# Patient Record
Sex: FEMALE | Race: BLACK OR AFRICAN AMERICAN | NOT HISPANIC OR LATINO | URBAN - METROPOLITAN AREA
[De-identification: names, ages, dates, MRNs, and addresses within clinical notes are randomized per-mention and may not be internally consistent; named-entity substitution may affect disease eponyms.]

---

## 2022-08-14 ENCOUNTER — INPATIENT (INPATIENT)
Facility: HOSPITAL | Age: 47
LOS: 1 days | Discharge: HOME | DRG: 896 | End: 2022-08-16
Attending: STUDENT IN AN ORGANIZED HEALTH CARE EDUCATION/TRAINING PROGRAM | Admitting: STUDENT IN AN ORGANIZED HEALTH CARE EDUCATION/TRAINING PROGRAM
Payer: MEDICAID

## 2022-08-14 VITALS
OXYGEN SATURATION: 99 % | HEART RATE: 160 BPM | RESPIRATION RATE: 19 BRPM | TEMPERATURE: 100 F | SYSTOLIC BLOOD PRESSURE: 203 MMHG | DIASTOLIC BLOOD PRESSURE: 107 MMHG

## 2022-08-14 LAB
ALBUMIN SERPL ELPH-MCNC: 3.6 G/DL — SIGNIFICANT CHANGE UP (ref 3.5–5)
ALP SERPL-CCNC: 80 U/L — SIGNIFICANT CHANGE UP (ref 40–120)
ALT FLD-CCNC: 55 U/L DA — SIGNIFICANT CHANGE UP (ref 10–60)
AMMONIA BLD-MCNC: 97 UMOL/L — HIGH (ref 11–32)
ANION GAP SERPL CALC-SCNC: 20 MMOL/L — HIGH (ref 5–17)
APAP SERPL-MCNC: <2 UG/ML — LOW (ref 10–30)
APPEARANCE UR: CLEAR — SIGNIFICANT CHANGE UP
AST SERPL-CCNC: 50 U/L — HIGH (ref 10–40)
BACTERIA # UR AUTO: ABNORMAL /HPF
BASE EXCESS BLDV CALC-SCNC: -7.1 MMOL/L — SIGNIFICANT CHANGE UP
BASOPHILS # BLD AUTO: 0.02 K/UL — SIGNIFICANT CHANGE UP (ref 0–0.2)
BASOPHILS NFR BLD AUTO: 0.3 % — SIGNIFICANT CHANGE UP (ref 0–2)
BILIRUB SERPL-MCNC: 0.5 MG/DL — SIGNIFICANT CHANGE UP (ref 0.2–1.2)
BILIRUB UR-MCNC: NEGATIVE — SIGNIFICANT CHANGE UP
BLOOD GAS COMMENTS, VENOUS: SIGNIFICANT CHANGE UP
BUN SERPL-MCNC: 34 MG/DL — HIGH (ref 7–18)
CALCIUM SERPL-MCNC: 8.8 MG/DL — SIGNIFICANT CHANGE UP (ref 8.4–10.5)
CHLORIDE SERPL-SCNC: 102 MMOL/L — SIGNIFICANT CHANGE UP (ref 96–108)
CK SERPL-CCNC: 276 U/L — HIGH (ref 21–215)
CO2 SERPL-SCNC: 17 MMOL/L — LOW (ref 22–31)
COLOR SPEC: YELLOW — SIGNIFICANT CHANGE UP
CREAT SERPL-MCNC: 1.8 MG/DL — HIGH (ref 0.5–1.3)
DIFF PNL FLD: NEGATIVE — SIGNIFICANT CHANGE UP
EGFR: 35 ML/MIN/1.73M2 — LOW
EOSINOPHIL # BLD AUTO: 0.05 K/UL — SIGNIFICANT CHANGE UP (ref 0–0.5)
EOSINOPHIL NFR BLD AUTO: 0.8 % — SIGNIFICANT CHANGE UP (ref 0–6)
EPI CELLS # UR: SIGNIFICANT CHANGE UP /HPF
ETHANOL SERPL-MCNC: 7 MG/DL — SIGNIFICANT CHANGE UP (ref 0–10)
GLUCOSE SERPL-MCNC: 143 MG/DL — HIGH (ref 70–99)
GLUCOSE UR QL: NEGATIVE — SIGNIFICANT CHANGE UP
GRAN CASTS # UR COMP ASSIST: ABNORMAL /LPF
HCG UR QL: NEGATIVE — SIGNIFICANT CHANGE UP
HCO3 BLDV-SCNC: 18 MMOL/L — LOW (ref 22–29)
HCT VFR BLD CALC: 33.1 % — LOW (ref 34.5–45)
HGB BLD-MCNC: 10.9 G/DL — LOW (ref 11.5–15.5)
HIV 1 & 2 AB SERPL IA.RAPID: SIGNIFICANT CHANGE UP
HOROWITZ INDEX BLDV+IHG-RTO: 21 — SIGNIFICANT CHANGE UP
HYALINE CASTS # UR AUTO: ABNORMAL /LPF
IMM GRANULOCYTES NFR BLD AUTO: 0.2 % — SIGNIFICANT CHANGE UP (ref 0–1.5)
KETONES UR-MCNC: NEGATIVE — SIGNIFICANT CHANGE UP
LACTATE SERPL-SCNC: 10.8 MMOL/L — CRITICAL HIGH (ref 0.7–2)
LEUKOCYTE ESTERASE UR-ACNC: NEGATIVE — SIGNIFICANT CHANGE UP
LYMPHOCYTES # BLD AUTO: 1.22 K/UL — SIGNIFICANT CHANGE UP (ref 1–3.3)
LYMPHOCYTES # BLD AUTO: 19.8 % — SIGNIFICANT CHANGE UP (ref 13–44)
MCHC RBC-ENTMCNC: 31 PG — SIGNIFICANT CHANGE UP (ref 27–34)
MCHC RBC-ENTMCNC: 32.9 GM/DL — SIGNIFICANT CHANGE UP (ref 32–36)
MCV RBC AUTO: 94 FL — SIGNIFICANT CHANGE UP (ref 80–100)
MONOCYTES # BLD AUTO: 0.63 K/UL — SIGNIFICANT CHANGE UP (ref 0–0.9)
MONOCYTES NFR BLD AUTO: 10.2 % — SIGNIFICANT CHANGE UP (ref 2–14)
NEUTROPHILS # BLD AUTO: 4.22 K/UL — SIGNIFICANT CHANGE UP (ref 1.8–7.4)
NEUTROPHILS NFR BLD AUTO: 68.7 % — SIGNIFICANT CHANGE UP (ref 43–77)
NITRITE UR-MCNC: NEGATIVE — SIGNIFICANT CHANGE UP
NRBC # BLD: 0 /100 WBCS — SIGNIFICANT CHANGE UP (ref 0–0)
PCO2 BLDV: 34 MMHG — LOW (ref 39–42)
PCP SPEC-MCNC: SIGNIFICANT CHANGE UP
PH BLDV: 7.33 — SIGNIFICANT CHANGE UP (ref 7.32–7.43)
PH UR: 6 — SIGNIFICANT CHANGE UP (ref 5–8)
PLATELET # BLD AUTO: 108 K/UL — LOW (ref 150–400)
PO2 BLDV: 92 MMHG — SIGNIFICANT CHANGE UP
POTASSIUM SERPL-MCNC: 3.6 MMOL/L — SIGNIFICANT CHANGE UP (ref 3.5–5.3)
POTASSIUM SERPL-SCNC: 3.6 MMOL/L — SIGNIFICANT CHANGE UP (ref 3.5–5.3)
PROT SERPL-MCNC: 7.8 G/DL — SIGNIFICANT CHANGE UP (ref 6–8.3)
PROT UR-MCNC: 100
RBC # BLD: 3.52 M/UL — LOW (ref 3.8–5.2)
RBC # FLD: 17.7 % — HIGH (ref 10.3–14.5)
RBC CASTS # UR COMP ASSIST: SIGNIFICANT CHANGE UP /HPF (ref 0–2)
SALICYLATES SERPL-MCNC: <1.7 MG/DL — LOW (ref 2.8–20)
SAO2 % BLDV: 97.3 % — SIGNIFICANT CHANGE UP
SODIUM SERPL-SCNC: 139 MMOL/L — SIGNIFICANT CHANGE UP (ref 135–145)
SP GR SPEC: 1.02 — SIGNIFICANT CHANGE UP (ref 1.01–1.02)
T4 AB SER-ACNC: 6.2 UG/DL — SIGNIFICANT CHANGE UP (ref 4.6–12)
TROPONIN I, HIGH SENSITIVITY RESULT: 14.3 NG/L — SIGNIFICANT CHANGE UP
TSH SERPL-MCNC: 1.1 UU/ML — SIGNIFICANT CHANGE UP (ref 0.34–4.82)
UROBILINOGEN FLD QL: NEGATIVE — SIGNIFICANT CHANGE UP
WBC # BLD: 6.15 K/UL — SIGNIFICANT CHANGE UP (ref 3.8–10.5)
WBC # FLD AUTO: 6.15 K/UL — SIGNIFICANT CHANGE UP (ref 3.8–10.5)
WBC UR QL: SIGNIFICANT CHANGE UP /HPF (ref 0–5)

## 2022-08-14 PROCEDURE — 99291 CRITICAL CARE FIRST HOUR: CPT

## 2022-08-14 RX ORDER — THIAMINE MONONITRATE (VIT B1) 100 MG
500 TABLET ORAL ONCE
Refills: 0 | Status: COMPLETED | OUTPATIENT
Start: 2022-08-14 | End: 2022-08-14

## 2022-08-14 RX ORDER — DIPHENHYDRAMINE HCL 50 MG
50 CAPSULE ORAL ONCE
Refills: 0 | Status: COMPLETED | OUTPATIENT
Start: 2022-08-14 | End: 2022-08-14

## 2022-08-14 RX ORDER — PIPERACILLIN AND TAZOBACTAM 4; .5 G/20ML; G/20ML
3.38 INJECTION, POWDER, LYOPHILIZED, FOR SOLUTION INTRAVENOUS ONCE
Refills: 0 | Status: COMPLETED | OUTPATIENT
Start: 2022-08-14 | End: 2022-08-14

## 2022-08-14 RX ORDER — VANCOMYCIN HCL 1 G
1000 VIAL (EA) INTRAVENOUS ONCE
Refills: 0 | Status: COMPLETED | OUTPATIENT
Start: 2022-08-14 | End: 2022-08-14

## 2022-08-14 RX ORDER — FOLIC ACID 0.8 MG
1 TABLET ORAL ONCE
Refills: 0 | Status: COMPLETED | OUTPATIENT
Start: 2022-08-14 | End: 2022-08-14

## 2022-08-14 RX ORDER — ACETAMINOPHEN 500 MG
1000 TABLET ORAL ONCE
Refills: 0 | Status: COMPLETED | OUTPATIENT
Start: 2022-08-14 | End: 2022-08-14

## 2022-08-14 RX ORDER — DIAZEPAM 5 MG
5 TABLET ORAL ONCE
Refills: 0 | Status: DISCONTINUED | OUTPATIENT
Start: 2022-08-14 | End: 2022-08-14

## 2022-08-14 RX ORDER — SODIUM CHLORIDE 9 MG/ML
2000 INJECTION INTRAMUSCULAR; INTRAVENOUS; SUBCUTANEOUS ONCE
Refills: 0 | Status: COMPLETED | OUTPATIENT
Start: 2022-08-14 | End: 2022-08-14

## 2022-08-14 RX ORDER — SODIUM CHLORIDE 9 MG/ML
1000 INJECTION INTRAMUSCULAR; INTRAVENOUS; SUBCUTANEOUS ONCE
Refills: 0 | Status: COMPLETED | OUTPATIENT
Start: 2022-08-14 | End: 2022-08-14

## 2022-08-14 RX ADMIN — Medication 250 MILLIGRAM(S): at 22:57

## 2022-08-14 RX ADMIN — Medication 5 MILLIGRAM(S): at 22:56

## 2022-08-14 RX ADMIN — Medication 1000 MILLIGRAM(S): at 23:20

## 2022-08-14 RX ADMIN — Medication 105 MILLIGRAM(S): at 22:57

## 2022-08-14 RX ADMIN — Medication 400 MILLIGRAM(S): at 22:56

## 2022-08-14 RX ADMIN — SODIUM CHLORIDE 1000 MILLILITER(S): 9 INJECTION INTRAMUSCULAR; INTRAVENOUS; SUBCUTANEOUS at 21:56

## 2022-08-14 RX ADMIN — Medication 50 MILLIGRAM(S): at 21:50

## 2022-08-14 RX ADMIN — Medication 2 MILLIGRAM(S): at 23:34

## 2022-08-14 RX ADMIN — PIPERACILLIN AND TAZOBACTAM 200 GRAM(S): 4; .5 INJECTION, POWDER, LYOPHILIZED, FOR SOLUTION INTRAVENOUS at 22:57

## 2022-08-14 RX ADMIN — Medication 2 MILLIGRAM(S): at 21:56

## 2022-08-14 NOTE — ED ADULT TRIAGE NOTE - CHIEF COMPLAINT QUOTE
chest pain , svt started 1 hour ago, she was drinking ETOH everyday, stopped 3 days ago, 30 mg of adenosine was given by EMS

## 2022-08-14 NOTE — ED PROVIDER NOTE - OBJECTIVE STATEMENT
47 yr old female with hx of alcohol abuse presents to ed via ems for chest pain, svt per ems over 160 s/p adenosine 6mg, 6mg and 12mg which dropped to 130's sinus tachycardia, per ems she was drinking ETOH everyday, stopped 3 days ago. pt arrive agitated and unable to provide hx

## 2022-08-14 NOTE — ED PROVIDER NOTE - CRITICAL CARE ATTENDING CONTRIBUTION TO CARE
rhodes: ams possibly DT vs hyponatremia vs ich vs metabolic encephalopathy vs uti vs arrhythmia vs thyroid storm vs tox vs alcohol intox vs uremia vs bacteremia- needs extensive workup, cardiac monitor, fluids, thiamine, ativan 2mg and benadryl 50mg ivp then ekg due to agitation and danger to self/others.

## 2022-08-14 NOTE — ED PROVIDER NOTE - PROGRESS NOTE DETAILS
Kathleen: pt received 50mg benadryl, 2 ativan ivp and then 5 mg ivp valium- HR still tachy at 140 but still awake with no resp derangement. pt BP stable. s/o to dr hodges to f/u labs and imaging- highly concern for DT patient signedout to me by Dr. Kathleen, awaiting labs/imaging prior to dispo.  labs show wbc wnl, Cr 1.8, lactate 10->1.7 after IVF, UA neg, CXR unremarkable, CT head unremarkable  after IV ativan/valium patient sleeping, HR and BP improved, Dr. Hughes from ICU consulted who does not recommend ICU level care at this time. patient stable for medical admission. Dr. Giraldo and MAR informed.  ~Braydon MARTINEZ

## 2022-08-14 NOTE — ED PROVIDER NOTE - CLINICAL SUMMARY MEDICAL DECISION MAKING FREE TEXT BOX
47 yr old female with hx of alcohol abuse presents to ed via ems for chest pain, svt per ems over 160 s/p adenosine 6mg, 6mg and 12mg which dropped to 130's sinus tachycardia, per ems she was drinking ETOH everyday, stopped 3 days ago. pt arrive agitated and unable to provide hx    ams possibly DT vs hyponatremia vs ich vs metabolic encephalopathy vs uti vs arrhythmia vs thyroid storm vs tox vs alcohol intox vs uremia vs bacteremia- needs extensive workup, cardiac monitor, fluids, thiamine, ativan 2mg and benadryl 50mg ivp then ekg due to agitation and danger to self/others.

## 2022-08-15 DIAGNOSIS — N17.9 ACUTE KIDNEY FAILURE, UNSPECIFIED: ICD-10-CM

## 2022-08-15 DIAGNOSIS — F15.929 OTHER STIMULANT USE, UNSPECIFIED WITH INTOXICATION, UNSPECIFIED: ICD-10-CM

## 2022-08-15 DIAGNOSIS — D64.9 ANEMIA, UNSPECIFIED: ICD-10-CM

## 2022-08-15 DIAGNOSIS — D69.6 THROMBOCYTOPENIA, UNSPECIFIED: ICD-10-CM

## 2022-08-15 DIAGNOSIS — F10.931 ALCOHOL USE, UNSPECIFIED WITH WITHDRAWAL DELIRIUM: ICD-10-CM

## 2022-08-15 DIAGNOSIS — F19.10 OTHER PSYCHOACTIVE SUBSTANCE ABUSE, UNCOMPLICATED: ICD-10-CM

## 2022-08-15 DIAGNOSIS — F10.239 ALCOHOL DEPENDENCE WITH WITHDRAWAL, UNSPECIFIED: ICD-10-CM

## 2022-08-15 DIAGNOSIS — Z29.9 ENCOUNTER FOR PROPHYLACTIC MEASURES, UNSPECIFIED: ICD-10-CM

## 2022-08-15 DIAGNOSIS — R65.10 SYSTEMIC INFLAMMATORY RESPONSE SYNDROME (SIRS) OF NON-INFECTIOUS ORIGIN WITHOUT ACUTE ORGAN DYSFUNCTION: ICD-10-CM

## 2022-08-15 DIAGNOSIS — R73.9 HYPERGLYCEMIA, UNSPECIFIED: ICD-10-CM

## 2022-08-15 DIAGNOSIS — R00.0 TACHYCARDIA, UNSPECIFIED: ICD-10-CM

## 2022-08-15 LAB
A1C WITH ESTIMATED AVERAGE GLUCOSE RESULT: 5.7 % — HIGH (ref 4–5.6)
ALBUMIN SERPL ELPH-MCNC: 1.6 G/DL — LOW (ref 3.5–5)
ALP SERPL-CCNC: SIGNIFICANT CHANGE UP U/L (ref 40–120)
ALT FLD-CCNC: SIGNIFICANT CHANGE UP U/L DA (ref 10–60)
AMPHET UR-MCNC: POSITIVE
ANION GAP SERPL CALC-SCNC: 12 MMOL/L — SIGNIFICANT CHANGE UP (ref 5–17)
ANION GAP SERPL CALC-SCNC: 6 MMOL/L — SIGNIFICANT CHANGE UP (ref 5–17)
ANISOCYTOSIS BLD QL: SLIGHT — SIGNIFICANT CHANGE UP
AST SERPL-CCNC: SIGNIFICANT CHANGE UP U/L (ref 10–40)
BARBITURATES UR SCN-MCNC: NEGATIVE — SIGNIFICANT CHANGE UP
BASOPHILS # BLD AUTO: 0.01 K/UL — SIGNIFICANT CHANGE UP (ref 0–0.2)
BASOPHILS NFR BLD AUTO: 0.2 % — SIGNIFICANT CHANGE UP (ref 0–2)
BENZODIAZ UR-MCNC: POSITIVE
BILIRUB SERPL-MCNC: SIGNIFICANT CHANGE UP MG/DL (ref 0.2–1.2)
BUN SERPL-MCNC: 14 MG/DL — SIGNIFICANT CHANGE UP (ref 7–18)
BUN SERPL-MCNC: 20 MG/DL — HIGH (ref 7–18)
CALCIUM SERPL-MCNC: 7.3 MG/DL — LOW (ref 8.4–10.5)
CALCIUM SERPL-MCNC: SIGNIFICANT CHANGE UP MG/DL (ref 8.4–10.5)
CHLORIDE SERPL-SCNC: 112 MMOL/L — HIGH (ref 96–108)
CHLORIDE SERPL-SCNC: 98 MMOL/L — SIGNIFICANT CHANGE UP (ref 96–108)
CO2 SERPL-SCNC: 14 MMOL/L — LOW (ref 22–31)
CO2 SERPL-SCNC: 21 MMOL/L — LOW (ref 22–31)
COCAINE METAB.OTHER UR-MCNC: NEGATIVE — SIGNIFICANT CHANGE UP
CREAT SERPL-MCNC: 0.69 MG/DL — SIGNIFICANT CHANGE UP (ref 0.5–1.3)
CREAT SERPL-MCNC: 1.01 MG/DL — SIGNIFICANT CHANGE UP (ref 0.5–1.3)
EGFR: 108 ML/MIN/1.73M2 — SIGNIFICANT CHANGE UP
EGFR: 70 ML/MIN/1.73M2 — SIGNIFICANT CHANGE UP
EOSINOPHIL # BLD AUTO: 0.08 K/UL — SIGNIFICANT CHANGE UP (ref 0–0.5)
EOSINOPHIL NFR BLD AUTO: 1.9 % — SIGNIFICANT CHANGE UP (ref 0–6)
ESTIMATED AVERAGE GLUCOSE: 117 MG/DL — HIGH (ref 68–114)
GLUCOSE BLDC GLUCOMTR-MCNC: 101 MG/DL — HIGH (ref 70–99)
GLUCOSE BLDC GLUCOMTR-MCNC: 103 MG/DL — HIGH (ref 70–99)
GLUCOSE BLDC GLUCOMTR-MCNC: 74 MG/DL — SIGNIFICANT CHANGE UP (ref 70–99)
GLUCOSE BLDC GLUCOMTR-MCNC: 75 MG/DL — SIGNIFICANT CHANGE UP (ref 70–99)
GLUCOSE BLDC GLUCOMTR-MCNC: 86 MG/DL — SIGNIFICANT CHANGE UP (ref 70–99)
GLUCOSE SERPL-MCNC: 68 MG/DL — LOW (ref 70–99)
GLUCOSE SERPL-MCNC: SIGNIFICANT CHANGE UP MG/DL (ref 70–99)
HCT VFR BLD CALC: 22.9 % — LOW (ref 34.5–45)
HGB BLD-MCNC: 7.1 G/DL — LOW (ref 11.5–15.5)
HYPOCHROMIA BLD QL: SLIGHT — SIGNIFICANT CHANGE UP
IMM GRANULOCYTES NFR BLD AUTO: 0.5 % — SIGNIFICANT CHANGE UP (ref 0–1.5)
LACTATE SERPL-SCNC: 1.7 MMOL/L — SIGNIFICANT CHANGE UP (ref 0.7–2)
LYMPHOCYTES # BLD AUTO: 0.84 K/UL — LOW (ref 1–3.3)
LYMPHOCYTES # BLD AUTO: 19.7 % — SIGNIFICANT CHANGE UP (ref 13–44)
MACROCYTES BLD QL: SLIGHT — SIGNIFICANT CHANGE UP
MAGNESIUM SERPL-MCNC: 1.2 MG/DL — LOW (ref 1.6–2.6)
MANUAL SMEAR VERIFICATION: SIGNIFICANT CHANGE UP
MCHC RBC-ENTMCNC: 31 GM/DL — LOW (ref 32–36)
MCHC RBC-ENTMCNC: 31.3 PG — SIGNIFICANT CHANGE UP (ref 27–34)
MCV RBC AUTO: 100.9 FL — HIGH (ref 80–100)
METHADONE UR-MCNC: NEGATIVE — SIGNIFICANT CHANGE UP
MONOCYTES # BLD AUTO: 0.34 K/UL — SIGNIFICANT CHANGE UP (ref 0–0.9)
MONOCYTES NFR BLD AUTO: 8 % — SIGNIFICANT CHANGE UP (ref 2–14)
NEUTROPHILS # BLD AUTO: 2.98 K/UL — SIGNIFICANT CHANGE UP (ref 1.8–7.4)
NEUTROPHILS NFR BLD AUTO: 69.7 % — SIGNIFICANT CHANGE UP (ref 43–77)
NRBC # BLD: 0 /100 WBCS — SIGNIFICANT CHANGE UP (ref 0–0)
OPIATES UR-MCNC: NEGATIVE — SIGNIFICANT CHANGE UP
OVALOCYTES BLD QL SMEAR: SLIGHT — SIGNIFICANT CHANGE UP
PCP UR-MCNC: NEGATIVE — SIGNIFICANT CHANGE UP
PHOSPHATE SERPL-MCNC: 2 MG/DL — LOW (ref 2.5–4.5)
PLAT MORPH BLD: NORMAL — SIGNIFICANT CHANGE UP
PLATELET # BLD AUTO: 68 K/UL — LOW (ref 150–400)
POIKILOCYTOSIS BLD QL AUTO: SLIGHT — SIGNIFICANT CHANGE UP
POTASSIUM SERPL-MCNC: 2 MMOL/L — CRITICAL LOW (ref 3.5–5.3)
POTASSIUM SERPL-MCNC: 4.6 MMOL/L — SIGNIFICANT CHANGE UP (ref 3.5–5.3)
POTASSIUM SERPL-SCNC: 2 MMOL/L — CRITICAL LOW (ref 3.5–5.3)
POTASSIUM SERPL-SCNC: 4.6 MMOL/L — SIGNIFICANT CHANGE UP (ref 3.5–5.3)
PROCALCITONIN SERPL-MCNC: 0.19 NG/ML — HIGH (ref 0.02–0.1)
PROT SERPL-MCNC: SIGNIFICANT CHANGE UP G/DL (ref 6–8.3)
RBC # BLD: 2.27 M/UL — LOW (ref 3.8–5.2)
RBC # FLD: 18.9 % — HIGH (ref 10.3–14.5)
RBC BLD AUTO: ABNORMAL
SARS-COV-2 RNA SPEC QL NAA+PROBE: SIGNIFICANT CHANGE UP
SCHISTOCYTES BLD QL AUTO: SLIGHT — SIGNIFICANT CHANGE UP
SODIUM SERPL-SCNC: 124 MMOL/L — LOW (ref 135–145)
SODIUM SERPL-SCNC: 139 MMOL/L — SIGNIFICANT CHANGE UP (ref 135–145)
THC UR QL: POSITIVE
WBC # BLD: 4.27 K/UL — SIGNIFICANT CHANGE UP (ref 3.8–10.5)
WBC # FLD AUTO: 4.27 K/UL — SIGNIFICANT CHANGE UP (ref 3.8–10.5)

## 2022-08-15 PROCEDURE — 99223 1ST HOSP IP/OBS HIGH 75: CPT

## 2022-08-15 PROCEDURE — 70450 CT HEAD/BRAIN W/O DYE: CPT | Mod: 26,MA

## 2022-08-15 PROCEDURE — 71045 X-RAY EXAM CHEST 1 VIEW: CPT | Mod: 26

## 2022-08-15 PROCEDURE — 12345: CPT | Mod: NC

## 2022-08-15 RX ORDER — POTASSIUM CHLORIDE 20 MEQ
10 PACKET (EA) ORAL
Refills: 0 | Status: DISCONTINUED | OUTPATIENT
Start: 2022-08-15 | End: 2022-08-15

## 2022-08-15 RX ORDER — THIAMINE MONONITRATE (VIT B1) 100 MG
500 TABLET ORAL EVERY 8 HOURS
Refills: 0 | Status: DISCONTINUED | OUTPATIENT
Start: 2022-08-15 | End: 2022-08-16

## 2022-08-15 RX ORDER — SODIUM CHLORIDE 9 MG/ML
1000 INJECTION, SOLUTION INTRAVENOUS
Refills: 0 | Status: DISCONTINUED | OUTPATIENT
Start: 2022-08-15 | End: 2022-08-15

## 2022-08-15 RX ORDER — HEPARIN SODIUM 5000 [USP'U]/ML
5000 INJECTION INTRAVENOUS; SUBCUTANEOUS EVERY 8 HOURS
Refills: 0 | Status: DISCONTINUED | OUTPATIENT
Start: 2022-08-15 | End: 2022-08-16

## 2022-08-15 RX ORDER — ACETAMINOPHEN 500 MG
1000 TABLET ORAL ONCE
Refills: 0 | Status: DISCONTINUED | OUTPATIENT
Start: 2022-08-15 | End: 2022-08-16

## 2022-08-15 RX ORDER — INSULIN LISPRO 100/ML
VIAL (ML) SUBCUTANEOUS
Refills: 0 | Status: DISCONTINUED | OUTPATIENT
Start: 2022-08-15 | End: 2022-08-16

## 2022-08-15 RX ORDER — CEFTRIAXONE 500 MG/1
2000 INJECTION, POWDER, FOR SOLUTION INTRAMUSCULAR; INTRAVENOUS EVERY 24 HOURS
Refills: 0 | Status: DISCONTINUED | OUTPATIENT
Start: 2022-08-15 | End: 2022-08-16

## 2022-08-15 RX ORDER — MIDAZOLAM HYDROCHLORIDE 1 MG/ML
2 INJECTION, SOLUTION INTRAMUSCULAR; INTRAVENOUS
Refills: 0 | Status: DISCONTINUED | OUTPATIENT
Start: 2022-08-15 | End: 2022-08-16

## 2022-08-15 RX ORDER — FOLIC ACID 0.8 MG
1 TABLET ORAL DAILY
Refills: 0 | Status: DISCONTINUED | OUTPATIENT
Start: 2022-08-15 | End: 2022-08-16

## 2022-08-15 RX ORDER — ACETAMINOPHEN 500 MG
650 TABLET ORAL EVERY 6 HOURS
Refills: 0 | Status: DISCONTINUED | OUTPATIENT
Start: 2022-08-15 | End: 2022-08-16

## 2022-08-15 RX ORDER — SODIUM CHLORIDE 9 MG/ML
1000 INJECTION, SOLUTION INTRAVENOUS
Refills: 0 | Status: DISCONTINUED | OUTPATIENT
Start: 2022-08-15 | End: 2022-08-16

## 2022-08-15 RX ADMIN — Medication 50 MILLIGRAM(S): at 11:57

## 2022-08-15 RX ADMIN — HEPARIN SODIUM 5000 UNIT(S): 5000 INJECTION INTRAVENOUS; SUBCUTANEOUS at 14:25

## 2022-08-15 RX ADMIN — Medication 105 MILLIGRAM(S): at 22:53

## 2022-08-15 RX ADMIN — SODIUM CHLORIDE 2000 MILLILITER(S): 9 INJECTION INTRAMUSCULAR; INTRAVENOUS; SUBCUTANEOUS at 00:26

## 2022-08-15 RX ADMIN — Medication 50 MILLIGRAM(S): at 03:33

## 2022-08-15 RX ADMIN — Medication 105 MILLIGRAM(S): at 05:30

## 2022-08-15 RX ADMIN — Medication 1 MILLIGRAM(S): at 00:39

## 2022-08-15 RX ADMIN — HEPARIN SODIUM 5000 UNIT(S): 5000 INJECTION INTRAVENOUS; SUBCUTANEOUS at 22:57

## 2022-08-15 RX ADMIN — SODIUM CHLORIDE 75 MILLILITER(S): 9 INJECTION, SOLUTION INTRAVENOUS at 08:43

## 2022-08-15 RX ADMIN — Medication 50 MILLIGRAM(S): at 22:54

## 2022-08-15 RX ADMIN — Medication 105 MILLIGRAM(S): at 14:58

## 2022-08-15 RX ADMIN — CEFTRIAXONE 100 MILLIGRAM(S): 500 INJECTION, POWDER, FOR SOLUTION INTRAMUSCULAR; INTRAVENOUS at 04:52

## 2022-08-15 RX ADMIN — Medication 100 MILLIEQUIVALENT(S): at 11:54

## 2022-08-15 RX ADMIN — HEPARIN SODIUM 5000 UNIT(S): 5000 INJECTION INTRAVENOUS; SUBCUTANEOUS at 03:33

## 2022-08-15 RX ADMIN — Medication 1 MILLIGRAM(S): at 12:29

## 2022-08-15 RX ADMIN — Medication 50 MILLIGRAM(S): at 09:13

## 2022-08-15 RX ADMIN — Medication 1 TABLET(S): at 11:57

## 2022-08-15 RX ADMIN — SODIUM CHLORIDE 100 MILLILITER(S): 9 INJECTION, SOLUTION INTRAVENOUS at 04:53

## 2022-08-15 NOTE — H&P ADULT - PROBLEM SELECTOR PLAN 4
- likely due to alcohol withdrawal  - no beta blocking agents given - Probable pre-renal ERIC   - Pt w/ SCr 1.8 on admission  - Baseline SCr unknown  - F/U Urine Lytes, calculate FeNa  - IVF for now, follow BMP daily

## 2022-08-15 NOTE — CHART NOTE - NSCHARTNOTEFT_GEN_A_CORE
Brief HPI: 47F resides with her parents w/ etoh abuse was BIBEMS for palpitations x1 hr. Pt drinks alcohol daily and stopped 3 days prior. EMS gave patient 30mg of adenosine (6mg, 12mg, 12mg) with underlying rhythm Sinus Tachycardia. As per EMS, pt was severely agitated and unable to provide history. Pt given valium 5mg, and ativan 4mg following which pt was lethargic and unable to provide history. Pt able to state that she smokes intermittently and a pack of cigarettes will last her 3 weeks. Pt unable to state her name or where she is. Pt at this time denies chest pain, sob, n/v/d.  Of note, it was reported that pt has displayed erratic behaviors attempting to leave her house nude as per Attending's discussion w/pt's parents.  Pt denies HI, SI, no AVH.       Objective: Vital signs last  24hrs  T(C): 36.6 (15 Aug 2022 11:16), Max: 39.9 (14 Aug 2022 22:30)  T(F): 97.8 (15 Aug 2022 11:16), Max: 103.8 (14 Aug 2022 22:30)  HR: 84 (15 Aug 2022 11:16) (84 - 160)  BP: 114/88 (15 Aug 2022 11:16) (100/65 - 203/107)  BP(mean): --  RR: 17 (15 Aug 2022 11:16) (17 - 21)  SpO2: 100% (15 Aug 2022 11:16) (98% - 100%)    Parameters below as of 15 Aug 2022 11:16  Patient On (Oxygen Delivery Method): room air    Focus PE:  Constitutional: in bed resting comfortably w/o signs of distress, does not appear septic   Neuro- alert and oriented x 3 but slow at answering, speaking w/ clear articulate speech, CNII-VII grossly intact, extremities w/ equal strength, a  Head: NCAT  Eyes- EOMI, PERRLA, clear conjunctiva and sclera, MMM  ENT-no rhinorrhea or septal hematoma, nml pharynx w/o tonsillar hypertrophy, erythema or exudates  Neck- supple, NT, no cervical LAD  CV-RRR, nml S1S2, no mmr, rubs or gallops  Resp- BL-CTA w/o increased WOB  GI- Abd sft, nt, nd, nr or guarding, no pulsatile mass+ BS  Extremities- all extremities w/ FROM, + distal pulses    Labs:                        7.1    4.27  )-----------( 68       ( 15 Aug 2022 10:37 )             22.9      08-15    139  |  112<H>  |  20<H>  ----------------------------<  68<L>  4.6   |  21<L>  |  0.69    Ca    7.3<L>      15 Aug 2022 11:45  Phos  2.0     08-15  Mg     1.2     08-15    TPro  SEE NOTE  /  Alb  SEE NOTE  /  TBili  SEE NOTE  /  DBili  x   /  AST  SEE NOTE  /  ALT  SEE NOTE  /  AlkPhos  SEE NOTE  08-15  Urine tox + for Amphetamine, Benzo and THC    A/P:  Pt is a 47F w/ etoh abuse was BIBEMS for palpitations x1 hr. Admitted for alcohol withdrawal noted to have a positive u-tox and report of erratic behaviors at home.   Plan:  1. Continue librium            2. Monitor lytes since likely lab error            3. Psych consulted in light of new bizarre behavior and will see pt    I have signed out the follow up of any pending tests (i.e. labs, radiological studies) to the oncoming night provider. Brief HPI: 47F resides with her parents w/ etoh abuse was BIBEMS for palpitations x1 hr. Pt drinks alcohol daily and stopped 3 days prior. EMS gave patient 30mg of adenosine (6mg, 12mg, 12mg) with underlying rhythm Sinus Tachycardia. As per EMS, pt was severely agitated and unable to provide history. Pt given valium 5mg, and ativan 4mg following which pt was lethargic and unable to provide history. Pt able to state that she smokes intermittently and a pack of cigarettes will last her 3 weeks. Pt unable to state her name or where she is. Pt at this time denies chest pain, sob, n/v/d.  Of note, it was reported that pt has displayed erratic behaviors attempting to leave her house nude as per Attending's discussion w/pt's parents.  Pt denies HI, SI, no AVH.       Objective: Vital signs last  24hrs  T(C): 36.6 (15 Aug 2022 11:16), Max: 39.9 (14 Aug 2022 22:30)  T(F): 97.8 (15 Aug 2022 11:16), Max: 103.8 (14 Aug 2022 22:30)  HR: 84 (15 Aug 2022 11:16) (84 - 160)  BP: 114/88 (15 Aug 2022 11:16) (100/65 - 203/107)  BP(mean): --  RR: 17 (15 Aug 2022 11:16) (17 - 21)  SpO2: 100% (15 Aug 2022 11:16) (98% - 100%)    Parameters below as of 15 Aug 2022 11:16  Patient On (Oxygen Delivery Method): room air    Focus PE:  Constitutional: in bed resting comfortably w/o signs of distress, does not appear septic   Neuro- alert and oriented x 3 but slow at answering, speaking w/ clear articulate speech, CNII-VII grossly intact, extremities w/ equal strength, a  Head: NCAT  Eyes- EOMI, PERRLA, clear conjunctiva and sclera, MMM  ENT-no rhinorrhea or septal hematoma, nml pharynx w/o tonsillar hypertrophy, erythema or exudates  Neck- supple, NT, no cervical LAD  CV-RRR, nml S1S2, no mmr, rubs or gallops  Resp- BL-CTA w/o increased WOB  GI- Abd sft, nt, nd, nr or guarding, no pulsatile mass+ BS  Extremities- all extremities w/ FROM, + distal pulses    Labs:                        7.1    4.27  )-----------( 68       ( 15 Aug 2022 10:37 )             22.9      08-15    139  |  112<H>  |  20<H>  ----------------------------<  68<L>  4.6   |  21<L>  |  0.69    Ca    7.3<L>      15 Aug 2022 11:45  Phos  2.0     08-15  Mg     1.2     08-15    TPro  SEE NOTE  /  Alb  SEE NOTE  /  TBili  SEE NOTE  /  DBili  x   /  AST  SEE NOTE  /  ALT  SEE NOTE  /  AlkPhos  SEE NOTE  08-15  Urine tox + for Amphetamine, Benzo and THC    A/P:  Pt is a 47F w/ etoh abuse was BIBEMS for palpitations x1 hr. Admitted for alcohol withdrawal noted to have a positive u-tox and report of erratic behaviors at home.   Plan:  1. Continue librium            2. Monitor lytes since likely lab error           3. Continue CTX empirically- presented w/ fever           4. F/u pending bld and urine cultures            5. Psych consulted in light of new bizarre behavior and will see pt    I have signed out the follow up of any pending tests (i.e. labs, radiological studies) to the oncoming night provider. Brief HPI: 47F resides with her parents w/ etoh abuse was BIBEMS for palpitations x1 hr. Pt drinks alcohol daily and stopped 3 days prior. EMS gave patient 30mg of adenosine (6mg, 12mg, 12mg) with underlying rhythm Sinus Tachycardia. As per EMS, pt was severely agitated and unable to provide history. Pt given valium 5mg, and ativan 4mg following which pt was lethargic and unable to provide history. Pt able to state that she smokes intermittently and a pack of cigarettes will last her 3 weeks. Pt unable to state her name or where she is. Pt at this time denies chest pain, sob, n/v/d.  Of note, it was reported that pt has displayed erratic behaviors attempting to leave her house nude as per Attending's discussion w/pt's parents.  Pt denies HI, SI, no AVH.       Objective: Vital signs last  24hrs  T(C): 36.6 (15 Aug 2022 11:16), Max: 39.9 (14 Aug 2022 22:30)  T(F): 97.8 (15 Aug 2022 11:16), Max: 103.8 (14 Aug 2022 22:30)  HR: 84 (15 Aug 2022 11:16) (84 - 160)  BP: 114/88 (15 Aug 2022 11:16) (100/65 - 203/107)  BP(mean): --  RR: 17 (15 Aug 2022 11:16) (17 - 21)  SpO2: 100% (15 Aug 2022 11:16) (98% - 100%)    Parameters below as of 15 Aug 2022 11:16  Patient On (Oxygen Delivery Method): room air    Focus PE:  Constitutional: in bed resting comfortably w/o signs of distress, does not appear septic   Neuro- alert and oriented x 3 but slow at answering, speaking w/ clear articulate speech, CNII-VII grossly intact, extremities w/ equal strength, a  Head: NCAT  Eyes- EOMI, PERRLA, clear conjunctiva and sclera, MMM  ENT-no rhinorrhea or septal hematoma, nml pharynx w/o tonsillar hypertrophy, erythema or exudates  Neck- supple, NT, no cervical LAD  CV-RRR, nml S1S2, no mmr, rubs or gallops  Resp- BL-CTA w/o increased WOB  GI- Abd sft, nt, nd, nr or guarding, no pulsatile mass+ BS  Extremities- all extremities w/ FROM, + distal pulses    Labs:                        7.1    4.27  )-----------( 68       ( 15 Aug 2022 10:37 )             22.9      08-15    139  |  112<H>  |  20<H>  ----------------------------<  68<L>  4.6   |  21<L>  |  0.69    Ca    7.3<L>      15 Aug 2022 11:45  Phos  2.0     08-15  Mg     1.2     08-15    TPro  SEE NOTE  /  Alb  SEE NOTE  /  TBili  SEE NOTE  /  DBili  x   /  AST  SEE NOTE  /  ALT  SEE NOTE  /  AlkPhos  SEE NOTE  08-15  Urine tox + for Amphetamine, Benzo and THC    A/P:  Pt is a 47F w/ etoh abuse was BIBEMS for palpitations x1 hr. Admitted for alcohol withdrawal noted to have a positive u-tox / amphetamines/ THC +, and report of erratic behaviors at home.   Plan:  1. Continue librium, CIWA protocol           2. Monitor lytes since likely lab error           3. Continue iv CTX 2 g empirically- presented w/ fever, follow blood cultures.            4. F/u pending bld Repeat CBC to confirm if its lab error) and urine cultures            5. Psych consulted in light of new bizarre behavior and will see patient.  I have signed out the follow up of any pending tests (i.e. labs, radiological studies) to the oncoming night provider.

## 2022-08-15 NOTE — CONSULT NOTE ADULT - SUBJECTIVE AND OBJECTIVE BOX
Patient is a 47y old  Female who presents with a chief complaint of palpitations    HPI:  47F w/ etoh abuse was BIBEMS for palpitations x1 hr. Pt drinks alcohol daily and stopped 3 days prior. EMS gave patient 30mg of adenosine (6mg, 12mg, 12mg) with underlying rhythm Sinus Tachycardia. As per EMS, pt was severely agitated and unable to provide history. Pt given valium 5mg, and ativan 4mg following which pt was lethargic and unable to provide history. Pt able to state that she smokes intermittently and a pack of cigarettes will last her 3 weeks. Pt unable to state her name or where she is. Pt at this time denies chest pain, sob, n/v/d.    Allergies    No Known Allergies    Intolerances        MEDICATIONS  (STANDING):    MEDICATIONS  (PRN):      Daily     Daily     Drug Dosing Weight      PAST MEDICAL & SURGICAL HISTORY:      FAMILY HISTORY:      SOCIAL HISTORY:    ADVANCE DIRECTIVES:    REVIEW OF SYSTEMS:    Unable to obtain due to mental status.        ICU Vital Signs Last 24 Hrs  T(C): 38.3 (15 Aug 2022 01:30), Max: 39.9 (14 Aug 2022 22:30)  T(F): 101 (15 Aug 2022 01:30), Max: 103.8 (14 Aug 2022 22:30)  HR: 113 (15 Aug 2022 01:30) (112 - 160)  BP: 101/72 (15 Aug 2022 01:30) (100/65 - 203/107)  BP(mean): --  ABP: --  ABP(mean): --  RR: 18 (15 Aug 2022 01:30) (18 - 21)  SpO2: 100% (15 Aug 2022 01:30) (99% - 100%)    O2 Parameters below as of 15 Aug 2022 01:30  Patient On (Oxygen Delivery Method): nasal cannula  O2 Flow (L/min): 2              I&O's Detail      PHYSICAL EXAM:    GENERAL: NAD, well-groomed, well-developed  HEAD:  Atraumatic, Normocephalic  EYES: EOMI, PERRLA, conjunctiva and sclera clear  ENMT: No tonsillar erythema, exudates, or enlargement; Moist mucous membranes, Good dentition, No lesions  NECK: Supple, No JVD, Normal thyroid  NERVOUS SYSTEM:  obtunded, able to move all extremities spontaneously  CHEST/LUNG: Clear to percussion bilaterally; No rales, rhonchi, wheezing, or rubs  HEART: Tachycardic rate and regular rhythm  ABDOMEN: Soft, Nontender, Nondistended; Bowel sounds present  EXTREMITIES:  2+ Peripheral Pulses, No clubbing, cyanosis, or edema  LYMPH: No lymphadenopathy noted  SKIN: No rashes or lesions    LABS:  CBC Full  -  ( 14 Aug 2022 22:40 )  WBC Count : 6.15 K/uL  RBC Count : 3.52 M/uL  Hemoglobin : 10.9 g/dL  Hematocrit : 33.1 %  Platelet Count - Automated : 108 K/uL  Mean Cell Volume : 94.0 fl  Mean Cell Hemoglobin : 31.0 pg  Mean Cell Hemoglobin Concentration : 32.9 gm/dL  Auto Neutrophil # : 4.22 K/uL  Auto Lymphocyte # : 1.22 K/uL  Auto Monocyte # : 0.63 K/uL  Auto Eosinophil # : 0.05 K/uL  Auto Basophil # : 0.02 K/uL  Auto Neutrophil % : 68.7 %  Auto Lymphocyte % : 19.8 %  Auto Monocyte % : 10.2 %  Auto Eosinophil % : 0.8 %  Auto Basophil % : 0.3 %        139  |  102  |  34<H>  ----------------------------<  143<H>  3.6   |  17<L>  |  1.80<H>    Ca    8.8      14 Aug 2022 22:40    TPro  7.8  /  Alb  3.6  /  TBili  0.5  /  DBili  x   /  AST  50<H>  /  ALT  55  /  AlkPhos  80      CAPILLARY BLOOD GLUCOSE      POCT Blood Glucose.: 172 mg/dL (14 Aug 2022 21:41)      Urinalysis Basic - ( 14 Aug 2022 23:49 )    Color: Yellow / Appearance: Clear / S.020 / pH: x  Gluc: x / Ketone: Negative  / Bili: Negative / Urobili: Negative   Blood: x / Protein: 100 / Nitrite: Negative   Leuk Esterase: Negative / RBC: 0-2 /HPF / WBC 3-5 /HPF   Sq Epi: x / Non Sq Epi: Few /HPF / Bacteria: Few /HPF      CARDIAC MARKERS ( 14 Aug 2022 22:40 )  x     / x     / 276 U/L / x     / x              EKG:    ECHO, US:    RADIOLOGY:    CRITICAL CARE TIME SPENT:

## 2022-08-15 NOTE — H&P ADULT - NSHPPHYSICALEXAM_GEN_ALL_CORE
Vital Signs  · Temp - 99.6F (37.6C)  · Heart Rate - 160  · BP - 203/107  · Respiration Rate - 19  · SpO2 (%) - 99    PHYSICAL EXAM:  GENERAL: NAD, no signs of respiratory distress  HEAD:  Atraumatic, Normocephalic  EYES: EOMI, PERRLA, conjunctiva and sclera clear  NECK: Supple, No JVD  CHEST/LUNG: Clear to auscultation bilaterally; No wheeze; No crackles; No accessory muscles used  HEART: Regular rate and rhythm; No murmurs;   ABDOMEN: Soft, Nontender, Nondistended; Bowel sounds present; No guarding  EXTREMITIES:  2+ Peripheral Pulses, No cyanosis or edema  PSYCH: AAOx3 (cannot fully assess due to AMS)  NEUROLOGY: non-focal; no meningeal signs  SKIN: No rashes or lesions

## 2022-08-15 NOTE — H&P ADULT - PROBLEM SELECTOR PLAN 2
- p/w fever 101F,   - f/u blood cultures  - May start rocephin 2g qd at this time x48 hrs, re-eval infection source  - CT Head negative  - No meningeal signs  - send procalcitonin  - ua negative  - tylenol prn Possible cause of palpitations and agitation   If it recurs, may need ammonium chloride to acidify urine for easy excretion.

## 2022-08-15 NOTE — H&P ADULT - ASSESSMENT
47F w/ etoh abuse was BIBEMS for palpitations x1 hr. Admitted for alcohol withdrawal.    PRIMARY TEAM TO CONFIRM PCP AND MED RECS

## 2022-08-15 NOTE — H&P ADULT - PROBLEM SELECTOR PLAN 9
- f/u A1c  - blood glucose 143  - Adjust insulin as indicated  - FS q6 while npo, or achs if able to tolerate diet

## 2022-08-15 NOTE — H&P ADULT - PROBLEM SELECTOR PLAN 6
- likely due to chronic alcohol abuse  - monitor qd - p/w fever 101F,   - f/u blood cultures  - May start rocephin 2g qd at this time x48 hrs, re-eval infection source  - CT Head negative  - No meningeal signs  - send procalcitonin  - ua negative  - tylenol prn

## 2022-08-15 NOTE — H&P ADULT - HISTORY OF PRESENT ILLNESS
47F w/ etoh abuse was BIBEMS for palpitations x1 hr. Pt drinks alcohol daily and stopped 3 days prior. EMS gave patient 30mg of adenosine (6mg, 12mg, 12mg) with underlying rhythm Sinus Tachycardia. As per EMS, pt was severely agitated and unable to provide history. Pt given valium 5mg, and ativan 4mg following which pt was lethargic and unable to provide history. Pt able to state that she smokes intermittently and a pack of cigarettes will last her 3 weeks. Pt unable to state her name or where she is. Pt at this time denies chest pain, sob, n/v/d.

## 2022-08-15 NOTE — H&P ADULT - PROBLEM SELECTOR PLAN 3
- Probable pre-renal ERIC   - Pt w/ SCr 1.8 on admission  - Baseline SCr unknown  - F/U Urine Lytes, calculate FeNa  - IVF for now, follow BMP daily - likely due to alcohol withdrawal  - no beta blocking agents given

## 2022-08-15 NOTE — H&P ADULT - ATTENDING COMMENTS
Pt seen at bedside  Unable to obtain full history after sedation.  47 year old woman with hx of alcohol abuse -brought in on account of sudden onset palpitations. She has a hx fo chronic alcohol abuse but quit 'cold turkey" 3 days prior per report. EMS noted her to be in suspected SVT and received total of 30 mg of adenosine.    Vital Signs Last 24 Hrs  T(C): 36.4 (15 Aug 2022 06:08), Max: 39.9 (14 Aug 2022 22:30)  T(F): 97.6 (15 Aug 2022 06:08), Max: 103.8 (14 Aug 2022 22:30)  HR: 89 (15 Aug 2022 06:08) (89 - 160)  BP: 105/73 (15 Aug 2022 06:08) (100/65 - 203/107)  RR: 18 (15 Aug 2022 06:08) (18 - 21)  SpO2: 100% (15 Aug 2022 06:08) (99% - 100%)    Parameters below as of 15 Aug 2022 06:08  Patient On (Oxygen Delivery Method): room air    Exam limited   Pt awakes to name but unable to maintain state of wakefulness  CTA B/L RRR S1S2 only    Labs reviewed  lactate 10.8 -> 1.7    08-14    139  |  102  |  34<H>  ----------------------------<  143<H>  3.6   |  17<L>  |  1.80<H>    Ca    8.8      14 Aug 2022 22:40    T Pro  7.8  /  Alb  3.6  /  T Bili  0.5  /  D Bili  x   /  AST  50<H>  /  ALT  55  /  Alk Phos  80  08-14    VBG noted  UA - unremarkable    U TOX   +ve THC, amphetamine and BDZ    CTH - unremarkable     Impression   - Acute alcohol withdrawal   - Possible amphetamine intoxication with symptomatic tachyarrhythmias  - Polysubstance abuse  including chronic alcohol abuse  - ERIC vs CKD vs ERIC on CKD    Plan   - Admit to Medicine   - [facility] protocol   - Thiamine 500mg Iv q 8 hourly X 3 - 5 days  - IV fluid hydration with D5NS @ 100cc/hour  - Check electrolytes level ans correct as necessary  - MVI   - repeat chemistry for renal indices  - Substance abuse counselling   - Other plans as above

## 2022-08-15 NOTE — CONSULT NOTE ADULT - ASSESSMENT
47F w/ etoh abuse was BIBEMS for palpitations x1 hr. Pt drinks alcohol daily and stopped 3 days prior. EMS gave patient 30mg of adenosine (6mg, 12mg, 12mg) with underlying rhythm Sinus. ICU consulted for Delirium Tremens.    Assessment:    Plan:  Neuro:  #Alcohol withdrawal w/ Seizures  - p/w elevated lactate 10.8, likely with seizure prior to EMS arrival  - Seizure likely due to alcohol withdrawal  - Pt drinks daily but unable to state what amount. Last drink was 8/12 approximately  - BAL 7 on admission  - Start CIWA Protocol  - Librium taper for high risk + versed 2mg q2 PRN for CIWA >7  - Thiamine 500mg q8 x3 days, folic acid qd, multivitamin  - SBIRT consult  - No meningeal signs    Cardiovascular:  #Sinus Tachycardia  - likely due to alcohol withdrawal  - no beta blocking agents given    Pulmonary:   #No issues    Gastrointestinal:  #No issues  - zofran prn if with nausea/vomiting    Renal:  #Elevated Creatinine  - Probable pre-renal ERIC   - Pt w/ SCr 1.8 on admission  - Baseline SCr unknown  - F/U Urine Lytes, calculate FeNa  - IVF for now, follow BMP daily      Infectious Diseases:  #SIRS vs SEPSIS  - p/w fever 101F,   - f/u blood cultures  - would not start empiric abx at this time  - CT Head negative  - No meningeal signs  - send procalcitonin  - ua negative  - tylenol prn    Heme/onc:   #Anemia  - Hb 10.4  - no signs of bleeding at this time  - monitor cbc    #Thrombocytopenia  - likely due to chronic alcohol abuse  - monitor qd    Endo:   #Hyperglycemia  - f/u A1c  - blood glucose 143  - Adjust insulin as indicated  - FS q6 while npo, or achs if able to tolerate diet      Skin/ catheter:   Peripheral lines    Prophylaxis:   Lovenox 40mg sq qd    Goals of Care:   Pt unable to participate at this time. FULL CODE.    Dispo:  No indication for ICU at this time. Reconsult ICU PRN. 47F w/ etoh abuse was BIBEMS for palpitations x1 hr. Pt drinks alcohol daily and stopped 3 days prior. EMS gave patient 30mg of adenosine (6mg, 12mg, 12mg) with underlying rhythm Sinus. ICU consulted for Delirium Tremens.    Assessment:    Plan:  Neuro:  #Alcohol withdrawal w/ Seizures  - p/w elevated lactate 10.8, likely with seizure prior to EMS arrival  - Seizure likely due to alcohol withdrawal  - Pt drinks daily but unable to state what amount. Last drink was 8/12 approximately  - BAL 7 on admission  - Start CIWA Protocol  - Librium taper for high risk + versed 2mg q2 PRN for CIWA >7  - Thiamine 500mg q8 x3 days, folic acid qd, multivitamin  - SBIRT consult  - No meningeal signs    Cardiovascular:  #Sinus Tachycardia  - likely due to alcohol withdrawal  - no beta blocking agents given    Pulmonary:   #No issues    Gastrointestinal:  #No issues  - zofran prn if with nausea/vomiting    Renal:  #Elevated Creatinine  - Probable pre-renal ERIC   - Pt w/ SCr 1.8 on admission  - Baseline SCr unknown  - F/U Urine Lytes, calculate FeNa  - IVF for now, follow BMP daily      Infectious Diseases:  #SIRS vs SEPSIS  - p/w fever 101F,   - f/u blood cultures  - May start rocephin 2g qd at this time x48 hrs, re-eval infection source  - CT Head negative  - No meningeal signs  - send procalcitonin  - ua negative  - tylenol prn    Heme/onc:   #Anemia  - Hb 10.4  - no signs of bleeding at this time  - monitor cbc    #Thrombocytopenia  - likely due to chronic alcohol abuse  - monitor qd    Endo:   #Hyperglycemia  - f/u A1c  - blood glucose 143  - Adjust insulin as indicated  - FS q6 while npo, or achs if able to tolerate diet      Skin/ catheter:   Peripheral lines    Prophylaxis:   Lovenox 40mg sq qd    Goals of Care:   Pt unable to participate at this time. FULL CODE.    Dispo:  No indication for ICU at this time. Reconsult ICU PRN. 47F w/ etoh abuse was BIBEMS for palpitations x1 hr. Pt drinks alcohol daily and stopped 3 days prior. EMS gave patient 30mg of adenosine (6mg, 12mg, 12mg) with underlying rhythm Sinus. ICU consulted for Delirium Tremens.    Assessment:  #Alcohol Withdrawal w/ Seizures  #Transaminitis  #Anemia  #Thrombocytopenia  #Sinus Tachycardia  #ERIC vs Elevated Creatinine  #SIRS vs SEPSIS    Plan:  Neuro:  #Alcohol withdrawal w/ Seizures  - p/w elevated lactate 10.8, likely with seizure prior to EMS arrival  - Seizure likely due to alcohol withdrawal  - Pt drinks daily but unable to state what amount. Last drink was 8/12 approximately  - BAL 7 on admission  - Start CIWA Protocol  - Librium taper for high risk + versed 2mg q2 PRN for CIWA >7  - Thiamine 500mg q8 x3 days, folic acid qd, multivitamin  - SBIRT consult  - No meningeal signs    Cardiovascular:  #Sinus Tachycardia  - likely due to alcohol withdrawal  - no beta blocking agents given    Pulmonary:   #No issues    Gastrointestinal:  #No issues  - zofran prn if with nausea/vomiting    Renal:  #Elevated Creatinine  - Probable pre-renal ERIC   - Pt w/ SCr 1.8 on admission  - Baseline SCr unknown  - F/U Urine Lytes, calculate FeNa  - IVF for now, follow BMP daily      Infectious Diseases:  #SIRS vs SEPSIS  - p/w fever 101F,   - f/u blood cultures  - May start rocephin 2g qd at this time x48 hrs, re-eval infection source  - CT Head negative  - No meningeal signs  - send procalcitonin  - ua negative  - tylenol prn    Heme/onc:   #Anemia  - Hb 10.4  - no signs of bleeding at this time  - monitor cbc    #Thrombocytopenia  - likely due to chronic alcohol abuse  - monitor qd    Endo:   #Hyperglycemia  - f/u A1c  - blood glucose 143  - Adjust insulin as indicated  - FS q6 while npo, or achs if able to tolerate diet      Skin/ catheter:   Peripheral lines    Prophylaxis:   Lovenox 40mg sq qd    Goals of Care:   Pt unable to participate at this time. FULL CODE.    Dispo:  No indication for ICU at this time. Reconsult ICU PRN.

## 2022-08-15 NOTE — H&P ADULT - PROBLEM SELECTOR PLAN 7
- f/u A1c  - blood glucose 143  - Adjust insulin as indicated  - FS q6 while npo, or achs if able to tolerate diet - Hb 10.4  - no signs of bleeding at this time  - monitor cbc

## 2022-08-15 NOTE — H&P ADULT - PROBLEM SELECTOR PLAN 5
- Hb 10.4  - no signs of bleeding at this time  - monitor cbc As above   - Alcohol   - Amphetamine  -

## 2022-08-15 NOTE — H&P ADULT - PROBLEM SELECTOR PLAN 1
- p/w elevated lactate 10.8, likely with seizure prior to EMS arrival  - Seizure likely due to alcohol withdrawal  - Pt drinks daily but unable to state what amount. Last drink was 8/12 approximately  - BAL 7 on admission  - Start CIWA Protocol  - Librium taper for high risk + versed 2mg q2 PRN for CIWA >7  - Thiamine 500mg q8 x3 days, folic acid qd, multivitamin  - SBIRT consult  - No meningeal signs

## 2022-08-16 VITALS — TEMPERATURE: 98 F | RESPIRATION RATE: 18 BRPM | HEART RATE: 90 BPM | OXYGEN SATURATION: 100 %

## 2022-08-16 DIAGNOSIS — F41.9 ANXIETY DISORDER, UNSPECIFIED: ICD-10-CM

## 2022-08-16 DIAGNOSIS — F32.9 MAJOR DEPRESSIVE DISORDER, SINGLE EPISODE, UNSPECIFIED: ICD-10-CM

## 2022-08-16 LAB
ALBUMIN SERPL ELPH-MCNC: 2.6 G/DL — LOW (ref 3.5–5)
ALP SERPL-CCNC: 60 U/L — SIGNIFICANT CHANGE UP (ref 40–120)
ALT FLD-CCNC: 31 U/L DA — SIGNIFICANT CHANGE UP (ref 10–60)
ANION GAP SERPL CALC-SCNC: 6 MMOL/L — SIGNIFICANT CHANGE UP (ref 5–17)
AST SERPL-CCNC: 25 U/L — SIGNIFICANT CHANGE UP (ref 10–40)
BILIRUB SERPL-MCNC: 0.2 MG/DL — SIGNIFICANT CHANGE UP (ref 0.2–1.2)
BUN SERPL-MCNC: 13 MG/DL — SIGNIFICANT CHANGE UP (ref 7–18)
CALCIUM SERPL-MCNC: 7.6 MG/DL — LOW (ref 8.4–10.5)
CHLORIDE SERPL-SCNC: 114 MMOL/L — HIGH (ref 96–108)
CO2 SERPL-SCNC: 20 MMOL/L — LOW (ref 22–31)
CREAT SERPL-MCNC: 0.59 MG/DL — SIGNIFICANT CHANGE UP (ref 0.5–1.3)
CULTURE RESULTS: SIGNIFICANT CHANGE UP
EGFR: 112 ML/MIN/1.73M2 — SIGNIFICANT CHANGE UP
GLUCOSE BLDC GLUCOMTR-MCNC: 114 MG/DL — HIGH (ref 70–99)
GLUCOSE BLDC GLUCOMTR-MCNC: 122 MG/DL — HIGH (ref 70–99)
GLUCOSE SERPL-MCNC: 118 MG/DL — HIGH (ref 70–99)
HCT VFR BLD CALC: 33.1 % — LOW (ref 34.5–45)
HCT VFR BLD CALC: 33.3 % — LOW (ref 34.5–45)
HGB BLD-MCNC: 10.5 G/DL — LOW (ref 11.5–15.5)
HGB BLD-MCNC: 10.7 G/DL — LOW (ref 11.5–15.5)
MCHC RBC-ENTMCNC: 30.4 PG — SIGNIFICANT CHANGE UP (ref 27–34)
MCHC RBC-ENTMCNC: 31.2 PG — SIGNIFICANT CHANGE UP (ref 27–34)
MCHC RBC-ENTMCNC: 31.5 GM/DL — LOW (ref 32–36)
MCHC RBC-ENTMCNC: 32.3 GM/DL — SIGNIFICANT CHANGE UP (ref 32–36)
MCV RBC AUTO: 96.5 FL — SIGNIFICANT CHANGE UP (ref 80–100)
MCV RBC AUTO: 96.5 FL — SIGNIFICANT CHANGE UP (ref 80–100)
NRBC # BLD: 0 /100 WBCS — SIGNIFICANT CHANGE UP (ref 0–0)
NRBC # BLD: 0 /100 WBCS — SIGNIFICANT CHANGE UP (ref 0–0)
PLATELET # BLD AUTO: 111 K/UL — LOW (ref 150–400)
PLATELET # BLD AUTO: 121 K/UL — LOW (ref 150–400)
POTASSIUM SERPL-MCNC: 3.5 MMOL/L — SIGNIFICANT CHANGE UP (ref 3.5–5.3)
POTASSIUM SERPL-SCNC: 3.5 MMOL/L — SIGNIFICANT CHANGE UP (ref 3.5–5.3)
PROT SERPL-MCNC: 6 G/DL — SIGNIFICANT CHANGE UP (ref 6–8.3)
RBC # BLD: 3.43 M/UL — LOW (ref 3.8–5.2)
RBC # BLD: 3.45 M/UL — LOW (ref 3.8–5.2)
RBC # FLD: 18 % — HIGH (ref 10.3–14.5)
RBC # FLD: 18.2 % — HIGH (ref 10.3–14.5)
SODIUM SERPL-SCNC: 140 MMOL/L — SIGNIFICANT CHANGE UP (ref 135–145)
SPECIMEN SOURCE: SIGNIFICANT CHANGE UP
WBC # BLD: 5.3 K/UL — SIGNIFICANT CHANGE UP (ref 3.8–10.5)
WBC # BLD: 5.9 K/UL — SIGNIFICANT CHANGE UP (ref 3.8–10.5)
WBC # FLD AUTO: 5.3 K/UL — SIGNIFICANT CHANGE UP (ref 3.8–10.5)
WBC # FLD AUTO: 5.9 K/UL — SIGNIFICANT CHANGE UP (ref 3.8–10.5)

## 2022-08-16 PROCEDURE — 71045 X-RAY EXAM CHEST 1 VIEW: CPT

## 2022-08-16 PROCEDURE — 96374 THER/PROPH/DIAG INJ IV PUSH: CPT

## 2022-08-16 PROCEDURE — 36415 COLL VENOUS BLD VENIPUNCTURE: CPT

## 2022-08-16 PROCEDURE — 87086 URINE CULTURE/COLONY COUNT: CPT

## 2022-08-16 PROCEDURE — 86703 HIV-1/HIV-2 1 RESULT ANTBDY: CPT

## 2022-08-16 PROCEDURE — 82550 ASSAY OF CK (CPK): CPT

## 2022-08-16 PROCEDURE — 85025 COMPLETE CBC W/AUTO DIFF WBC: CPT

## 2022-08-16 PROCEDURE — 87040 BLOOD CULTURE FOR BACTERIA: CPT

## 2022-08-16 PROCEDURE — 80307 DRUG TEST PRSMV CHEM ANLYZR: CPT

## 2022-08-16 PROCEDURE — 96375 TX/PRO/DX INJ NEW DRUG ADDON: CPT

## 2022-08-16 PROCEDURE — 81025 URINE PREGNANCY TEST: CPT

## 2022-08-16 PROCEDURE — 99285 EMERGENCY DEPT VISIT HI MDM: CPT | Mod: 25

## 2022-08-16 PROCEDURE — 96376 TX/PRO/DX INJ SAME DRUG ADON: CPT

## 2022-08-16 PROCEDURE — 90792 PSYCH DIAG EVAL W/MED SRVCS: CPT

## 2022-08-16 PROCEDURE — 84100 ASSAY OF PHOSPHORUS: CPT

## 2022-08-16 PROCEDURE — 83036 HEMOGLOBIN GLYCOSYLATED A1C: CPT

## 2022-08-16 PROCEDURE — 82962 GLUCOSE BLOOD TEST: CPT

## 2022-08-16 PROCEDURE — 99239 HOSP IP/OBS DSCHRG MGMT >30: CPT

## 2022-08-16 PROCEDURE — 93005 ELECTROCARDIOGRAM TRACING: CPT

## 2022-08-16 PROCEDURE — 70450 CT HEAD/BRAIN W/O DYE: CPT | Mod: MA

## 2022-08-16 PROCEDURE — 80053 COMPREHEN METABOLIC PANEL: CPT

## 2022-08-16 PROCEDURE — 80048 BASIC METABOLIC PNL TOTAL CA: CPT

## 2022-08-16 PROCEDURE — 84484 ASSAY OF TROPONIN QUANT: CPT

## 2022-08-16 PROCEDURE — 83735 ASSAY OF MAGNESIUM: CPT

## 2022-08-16 PROCEDURE — 84443 ASSAY THYROID STIM HORMONE: CPT

## 2022-08-16 PROCEDURE — 84436 ASSAY OF TOTAL THYROXINE: CPT

## 2022-08-16 PROCEDURE — 81001 URINALYSIS AUTO W/SCOPE: CPT

## 2022-08-16 PROCEDURE — 85027 COMPLETE CBC AUTOMATED: CPT

## 2022-08-16 PROCEDURE — 80061 LIPID PANEL: CPT

## 2022-08-16 PROCEDURE — 82140 ASSAY OF AMMONIA: CPT

## 2022-08-16 PROCEDURE — 82803 BLOOD GASES ANY COMBINATION: CPT

## 2022-08-16 PROCEDURE — 83605 ASSAY OF LACTIC ACID: CPT

## 2022-08-16 PROCEDURE — 87635 SARS-COV-2 COVID-19 AMP PRB: CPT

## 2022-08-16 PROCEDURE — 84145 PROCALCITONIN (PCT): CPT

## 2022-08-16 RX ORDER — ACETAMINOPHEN 500 MG
2 TABLET ORAL
Qty: 0 | Refills: 0 | DISCHARGE
Start: 2022-08-16

## 2022-08-16 RX ORDER — NICOTINE POLACRILEX 2 MG
1 GUM BUCCAL DAILY
Refills: 0 | Status: DISCONTINUED | OUTPATIENT
Start: 2022-08-16 | End: 2022-08-16

## 2022-08-16 RX ORDER — ACETAMINOPHEN 500 MG
100 TABLET ORAL
Qty: 0 | Refills: 0 | DISCHARGE
Start: 2022-08-16

## 2022-08-16 RX ADMIN — Medication 1 PATCH: at 00:51

## 2022-08-16 RX ADMIN — Medication 1 MILLIGRAM(S): at 13:47

## 2022-08-16 RX ADMIN — Medication 50 MILLIGRAM(S): at 13:54

## 2022-08-16 RX ADMIN — Medication 105 MILLIGRAM(S): at 05:19

## 2022-08-16 RX ADMIN — Medication 1 PATCH: at 07:39

## 2022-08-16 RX ADMIN — SODIUM CHLORIDE 75 MILLILITER(S): 9 INJECTION, SOLUTION INTRAVENOUS at 03:24

## 2022-08-16 RX ADMIN — Medication 105 MILLIGRAM(S): at 13:47

## 2022-08-16 RX ADMIN — CEFTRIAXONE 100 MILLIGRAM(S): 500 INJECTION, POWDER, FOR SOLUTION INTRAMUSCULAR; INTRAVENOUS at 05:13

## 2022-08-16 RX ADMIN — HEPARIN SODIUM 5000 UNIT(S): 5000 INJECTION INTRAVENOUS; SUBCUTANEOUS at 05:19

## 2022-08-16 RX ADMIN — Medication 50 MILLIGRAM(S): at 07:01

## 2022-08-16 RX ADMIN — HEPARIN SODIUM 5000 UNIT(S): 5000 INJECTION INTRAVENOUS; SUBCUTANEOUS at 13:55

## 2022-08-16 RX ADMIN — Medication 1 TABLET(S): at 13:46
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
## 2022-08-16 NOTE — BH CONSULTATION LIAISON ASSESSMENT NOTE - ADDITIONAL DETAILS ALL
Around 2005, took OD of Prozac i/s/o despair over loss of her [employer] business. Called 911 herself and was admitted to [facility] psych for 3d

## 2022-08-16 NOTE — BH CONSULTATION LIAISON ASSESSMENT NOTE - CURRENT MEDICATION
MEDICATIONS  (STANDING):  acetaminophen   IVPB .. 1000 milliGRAM(s) IV Intermittent once  cefTRIAXone   IVPB 2000 milliGRAM(s) IV Intermittent every 24 hours  chlordiazePOXIDE   Oral   chlordiazePOXIDE 50 milliGRAM(s) Oral every 8 hours  dextrose 5% + sodium chloride 0.9%. 1000 milliLiter(s) (75 mL/Hr) IV Continuous <Continuous>  folic acid Injectable 1 milliGRAM(s) IV Push daily  heparin   Injectable 5000 Unit(s) SubCutaneous every 8 hours  insulin lispro (ADMELOG) corrective regimen sliding scale   SubCutaneous Before meals and at bedtime  multivitamin 1 Tablet(s) Oral daily  nicotine -   7 mG/24Hr(s) Patch 1 Patch Transdermal daily  thiamine IVPB 500 milliGRAM(s) IV Intermittent every 8 hours    MEDICATIONS  (PRN):  acetaminophen     Tablet .. 650 milliGRAM(s) Oral every 6 hours PRN Temp greater or equal to 38C (100.4F), Moderate Pain (4 - 6)  midazolam Injectable 2 milliGRAM(s) IV Push every 2 hours PRN CIWA >7

## 2022-08-16 NOTE — BH CONSULTATION LIAISON ASSESSMENT NOTE - DESCRIPTION
B/R in [place] with both parents and multiple siblings. Parents  when she was young, mother remarried (stepfather now ). Graduated from [employer] with BS in psychology. Attended law school for 1 semester, but dropped out. Currently living in family home with mother and siblings, self-employed in 2 businesses. Boyfriend is a CPA.

## 2022-08-16 NOTE — DISCHARGE NOTE PROVIDER - NSDCMRMEDTOKEN_GEN_ALL_CORE_FT
acetaminophen 10 mg/mL intravenous solution: 100 milliliter(s) intravenous once  acetaminophen 325 mg oral tablet: 2 tab(s) orally every 6 hours, As needed, Temp greater or equal to 38C (100.4F), Moderate Pain (4 - 6)  Multiple Vitamins oral tablet: 1 tab(s) orally once a day

## 2022-08-16 NOTE — BH CONSULTATION LIAISON ASSESSMENT NOTE - NSBHCONSULTRECOMMENDOTHER_PSY_A_CORE FT
1. No indication for standing or PRN psychotropic medications at this time (pt denies interest)  2. Medical management as directed by primary team  3. Pt is psych cleared for D/C home as soon as she is medically optimized  4. Outpatient mental health referrals provided to patient:  a) [facility] walk in clinic [phone]  b) [facility] walk in center [phone]  c) [facility]  [place] [phone], [place] [phone]  d) [facility] [phone]  e) [facility] walk in [facility] [phone]  f) [phone] 24-hour telephone support  g) [facility] COVID-19 emotional support line [phone]  h) [place] [phone]  5. Pt has also been advised to visit [employer] to apply for health insurance  6. Case d/w LILO Liu of primary team    Komal Rojas MD  Director, Consultation-Liaison Psychiatry Service  [id number]

## 2022-08-16 NOTE — DISCHARGE NOTE PROVIDER - NSDCCPCAREPLAN_GEN_ALL_CORE_FT
PRINCIPAL DISCHARGE DIAGNOSIS  Diagnosis: Alcohol dependence with withdrawal  Assessment and Plan of Treatment: Alcohol withdrawal is a group of symptoms that occur when you drink alcohol daily and suddenly stop. It can begin within 5 hours of your last drink and get worse over 2 to 3 days. Withdrawal may also happen if you suddenly reduce the amount of alcohol that you normally drink. Take your medicine as directed. Contact your healthcare provider if you think your medicine is not helping or if you have side effects. Tell him or her if you are allergic to any medicine. Keep a list of the medicines, vitamins, and herbs you take. Include the amounts, and when and why you take them. Bring the list or the pill bottles to follow-up visits. Carry your medicine list with you in case of an emergency.  Have someone stay with you during withdrawal: This person should help you take your medicine and keep you in a calm, quiet environment. He or she should also watch your symptoms and know what to do if your symptoms get worse.   Learn to stop drinking alcohol safely: Work with your healthcare provider to develop a plan for you to stop drinking safely. A sudden stop or change can be life-threatening.  •Alcoholics Anonymous  Web Address: http://www.aa.org  •Substance Abuse and Mental Health Services Administration  [address]  [place] [address]  Web Address: http://www.[facility].gov        SECONDARY DISCHARGE DIAGNOSES  Diagnosis: Polysubstance abuse  Assessment and Plan of Treatment: Polysubstance use disorder (PUD) is a medical condition that develops from long-term use or misuse of 2 or more substances. You are not able to stop even though it causes physical or social problems. PUD includes use of a drug such as cocaine or misuse of alcohol, tobacco, or a prescription medicine such as opioids. This disorder is also called polysubstance abuse. Mood stabilizers may be given to help prevent or treat depression or anxiety caused by substance use and withdrawal.  Therapy may be offered in a hospital, outpatient facility, or drug rehabilitation center. Healthcare providers can help you make decisions about treatment programs. The goal is to help you decrease or stop taking the substances.  •Cognitive behavioral therapy (CBT) can help you manage depression and anxiety caused by PUD. CBT can be done with you and a talk therapist or in a group with others.  •Motivational enhancement therapy can help you set and reach healthy, positive goals.  •Twelve-step facilitation (TSF) is a short, structured approach to reach early recovery. It is done one-to-one with a therapist in 12 to   Safety guidelines:   •Do not combine medicines, drugs, or alcohol. The combination can cause an overdose, or cause you to stop breathing.  •Learn about the signs of an overdose so you know how to respond. Signs depend on the substances. Your heartbeat or breathing may be faster or slower than usual. You may have heavy sweating, vomiting, trouble sleeping, or sleeping too much. Your skin may be pale or clammy. You may have slurred or slow speech. Tell others about signs of an overdose so they will know what to do if needed. Talk to your healthcare provider about naloxone. You may be able to keep naloxone at home in case of an overdose. Your family and friends can also be trained on how to give it to you if needed. Get immediate help or call your local emergency number (911 in the [place]) for signs of an overdose.  •Take prescribed medicines exactly as directed. Do not take more than the r

## 2022-08-16 NOTE — BH CONSULTATION LIAISON ASSESSMENT NOTE - NSBHCHARTREVIEWVS_PSY_A_CORE FT
Vital Signs Last 24 Hrs  T(C): 36.6 (16 Aug 2022 14:29), Max: 36.7 (16 Aug 2022 02:24)  T(F): 97.9 (16 Aug 2022 14:29), Max: 98.1 (16 Aug 2022 02:24)  HR: 90 (16 Aug 2022 14:29) (85 - 94)  BP: 106/69 (16 Aug 2022 06:28) (106/69 - 131/84)  BP(mean): 82 (16 Aug 2022 06:28) (82 - 90)  RR: 18 (16 Aug 2022 14:29) (16 - 18)  SpO2: 100% (16 Aug 2022 14:29) (100% - 100%)    Parameters below as of 16 Aug 2022 14:29  Patient On (Oxygen Delivery Method): room air

## 2022-08-16 NOTE — DISCHARGE NOTE PROVIDER - CARE PROVIDER_API CALL
Justice Gonzalez)  Internal Medicine  [address] [place], [place] [address]  Phone: [phone]  Fax: [phone]  Follow Up Time: 1 week
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
## 2022-08-16 NOTE — PATIENT PROFILE ADULT - FALL HARM RISK - UNIVERSAL INTERVENTIONS
Bed in lowest position, wheels locked, appropriate side rails in place/Call bell, personal items and telephone in reach/Instruct patient to call for assistance before getting out of bed or chair/Non-slip footwear when patient is out of bed/[place] to call system/Physically safe environment - no spills, clutter or unnecessary equipment/Purposeful Proactive Rounding/Room/bathroom lighting operational, light cord in reach

## 2022-08-16 NOTE — DISCHARGE NOTE PROVIDER - ATTENDING DISCHARGE PHYSICAL EXAMINATION:
47 year old woman with hx of alcohol abuse -brought in on account of sudden onset palpitations. She has a hx fo chronic alcohol abuse but quit 'cold turkey" 3 days prior per report. EMS noted her to be in suspected SVT and received total of 30 mg of adenosine. Admit for Acute alcohol withdrawal, amphetamine intoxication with symptomatic tachyarrhythmias, Polysubstance abuse, ERIC, Anemia, elevated lactate which  may possible from outside alcohol withdrawal seizures, and fever.  Patient has been on librium taper, received IVF for alcohol withdrawal with improvement of symptoms. Patient seen by psychiatry. BCX NGTD, no fevers today, no other infectious source. Patient requesting discharge. Discussed risk of discharge as we are currently working up for other infectious etiology of fever and providing benzodiazepine taper for withdrawal to prevent possible alcohol withdrawal seizures. Patient understands risk of leaving AMA and continues to request to leave. Patient has capacity to make medical decisions. Left AMA  PHYSICAL EXAM:  GENERAL: NAD, no signs of respiratory distress  HEAD:  Atraumatic, Normocephalic  EYES: EOMI, PERRLA, conjunctiva and sclera clear  NECK: Supple, No JVD  CHEST/LUNG: Clear to auscultation bilaterally; No wheeze; No crackles; No accessory muscles used  HEART: Regular rate and rhythm; No murmurs;   ABDOMEN: Soft, Nontender, Nondistended; Bowel sounds present; No guarding  EXTREMITIES:  2+ Peripheral Pulses, No cyanosis or edema  PSYCH: AAOx3 (cannot fully assess due to AMS)  NEUROLOGY: non-focal; no meningeal signs  SKIN: No rashes or lesions

## 2022-08-16 NOTE — DISCHARGE NOTE PROVIDER - HOSPITAL COURSE
47F resides with her parents w/ etoh abuse was BIBEMS for palpitations x1 hr. Pt drinks alcohol daily and stopped 3 days prior. Patient was admitted to medicine for ETOH withdrawal, started on librium taper.  Psych followed for erratic behavior and deemed patient to have capacity. Patient opted to leave against medical advice because she wants to be home with her family.  They have normal mental status and adequate capacity to make medical decisions. The risks have been explained to the patient, including worsening illness, chronic pain, permanent disability and death.  The benefits of admission have also been explained, including the availability and proximity of nurses, physicians, monitoring, diagnostic testing, and treatment.  The patient was able to understand and state the risks and benefits of hospital admission. This was witnessed by nurse Corcoran and me.  They had the opportunity to ask questions about their medical condition.  The patient was treated to the extent that they would allow and knows that they may return for care at any time.  Follow-up has been discussed, return to the emergency room was emphasized

## 2022-08-16 NOTE — BH CONSULTATION LIAISON ASSESSMENT NOTE - NSBHCHARTREVIEWLAB_PSY_A_CORE FT
10.5   5.30  )-----------( 111      ( 16 Aug 2022 06:00 )             33.3   08-16    140  |  114<H>  |  13  ----------------------------<  118<H>  3.5   |  20<L>  |  0.59    Ca    7.6<L>      16 Aug 2022 06:00  Phos  2.0     08-15  Mg     1.2     08-15    TPro  6.0  /  Alb  2.6<L>  /  TBili  0.2  /  DBili  x   /  AST  25  /  ALT  31  /  AlkPhos  60  08-16

## 2022-08-20 LAB
CULTURE RESULTS: SIGNIFICANT CHANGE UP
CULTURE RESULTS: SIGNIFICANT CHANGE UP
SPECIMEN SOURCE: SIGNIFICANT CHANGE UP
SPECIMEN SOURCE: SIGNIFICANT CHANGE UP